# Patient Record
(demographics unavailable — no encounter records)

---

## 2025-01-21 NOTE — DISCUSSION/SUMMARY
[FreeTextEntry1] : 49 yo p5 menorrhagia, perimenopause  pelvic sono to do mammo uptodate we discussed HRT, declined at this time\ vaginal lubricants d/w pt

## 2025-01-21 NOTE — HISTORY OF PRESENT ILLNESS
[FreeTextEntry1] : 47 yo p5 Patient is here for evaluation , wants to discuss if Mirena is in good position .  Patient states Mirena inserted 6/20/2023 and [place] for management of endometriosis. Patient states she has occasional prolong menses lasting 7-10 days, not painful or heavy.  Has occasional hot flashes On atorvastatin for cholesterol  her daughter has lupus , on dialysis and needs kidney transplant  [TextBox_4] : [facility] No history of fibroids, cysts, or STDs Mirena IUD 6/ 2023
